# Patient Record
Sex: MALE | Race: WHITE | URBAN - METROPOLITAN AREA
[De-identification: names, ages, dates, MRNs, and addresses within clinical notes are randomized per-mention and may not be internally consistent; named-entity substitution may affect disease eponyms.]

---

## 2017-06-14 ENCOUNTER — TELEPHONE (OUTPATIENT)
Dept: DERMATOLOGY | Age: 47
End: 2017-06-14

## 2017-06-22 ENCOUNTER — OFFICE VISIT (OUTPATIENT)
Dept: DERMATOLOGY | Age: 47
End: 2017-06-22

## 2017-06-22 DIAGNOSIS — L82.0 INFLAMED SEBORRHEIC KERATOSIS: Primary | ICD-10-CM

## 2017-06-22 PROCEDURE — 17110 DESTRUCTION B9 LES UP TO 14: CPT | Performed by: DERMATOLOGY

## 2017-11-06 ENCOUNTER — OFFICE VISIT (OUTPATIENT)
Dept: DERMATOLOGY | Age: 47
End: 2017-11-06

## 2017-11-06 DIAGNOSIS — D22.9 MULTIPLE BENIGN NEVI: Primary | ICD-10-CM

## 2017-11-06 DIAGNOSIS — L82.0 INFLAMED SEBORRHEIC KERATOSIS: ICD-10-CM

## 2017-11-06 DIAGNOSIS — Z12.83 SCREENING EXAM FOR SKIN CANCER: ICD-10-CM

## 2017-11-06 PROCEDURE — G8484 FLU IMMUNIZE NO ADMIN: HCPCS | Performed by: DERMATOLOGY

## 2017-11-06 PROCEDURE — G8421 BMI NOT CALCULATED: HCPCS | Performed by: DERMATOLOGY

## 2017-11-06 PROCEDURE — 99213 OFFICE O/P EST LOW 20 MIN: CPT | Performed by: DERMATOLOGY

## 2017-11-06 PROCEDURE — 1036F TOBACCO NON-USER: CPT | Performed by: DERMATOLOGY

## 2017-11-06 PROCEDURE — G8427 DOCREV CUR MEDS BY ELIG CLIN: HCPCS | Performed by: DERMATOLOGY

## 2017-11-06 PROCEDURE — 17110 DESTRUCTION B9 LES UP TO 14: CPT | Performed by: DERMATOLOGY

## 2017-11-06 RX ORDER — LOVASTATIN 10 MG/1
10 TABLET ORAL NIGHTLY
COMMUNITY

## 2017-11-06 NOTE — PROGRESS NOTES
brown macules and papules. 2. L flexor forearm - well-defined \"stuck-on\" verrucous tan-brown papule(s) w/ surrounding bright erythema     Assessment and Plan     1. Benign acquired melanocytic nevi  -Recommend monthly self skin exams   -Educated regarding the ABCDEs of melanoma detection   -Call for any new/changing moles or concerning lesions  -Reviewed sun protective behavior -- sun avoidance during the peak hours of the day, sun-protective clothing (including hat and sunglasses), sunscreen use (water resistant, broad spectrum, SPF at least 30, need for reapplication every 2 to 3 hours), avoidance of tanning beds   -Return for full skin exam in 1 year (sooner if indicated)     2. Inflamed seborrheic keratosis(es)  -Reassurance re: benignity  -1 lesion(s) treated w/ liquid nitrogen x 2 cycles - L forearm. Edu re: risk of blister formation, discomfort, scar, hypopigmentation. Discussed wound care.

## 2018-08-24 DIAGNOSIS — R20.9 DISTURBANCE OF SKIN SENSATION: Primary | ICD-10-CM

## 2018-08-24 DIAGNOSIS — R21 RASH: Primary | ICD-10-CM

## 2018-08-24 RX ORDER — PREDNISONE 10 MG/1
TABLET ORAL
Qty: 14 TABLET | Refills: 0 | Status: CANCELLED | OUTPATIENT
Start: 2018-08-24

## 2018-08-24 RX ORDER — PREDNISONE 10 MG/1
TABLET ORAL
Qty: 14 TABLET | Refills: 0 | Status: SHIPPED | OUTPATIENT
Start: 2018-08-24 | End: 2018-11-12

## 2018-11-12 ENCOUNTER — OFFICE VISIT (OUTPATIENT)
Dept: DERMATOLOGY | Age: 48
End: 2018-11-12
Payer: COMMERCIAL

## 2018-11-12 DIAGNOSIS — R20.2 NOTALGIA PARESTHETICA: ICD-10-CM

## 2018-11-12 DIAGNOSIS — L82.1 SEBORRHEIC KERATOSES: ICD-10-CM

## 2018-11-12 DIAGNOSIS — L82.0 INFLAMED SEBORRHEIC KERATOSIS: ICD-10-CM

## 2018-11-12 DIAGNOSIS — D22.9 MULTIPLE BENIGN NEVI: Primary | ICD-10-CM

## 2018-11-12 DIAGNOSIS — D48.5 NEOPLASM OF UNCERTAIN BEHAVIOR OF SKIN: ICD-10-CM

## 2018-11-12 PROCEDURE — 11100 PR BIOPSY OF SKIN LESION: CPT | Performed by: DERMATOLOGY

## 2018-11-12 PROCEDURE — 99214 OFFICE O/P EST MOD 30 MIN: CPT | Performed by: DERMATOLOGY

## 2018-11-12 PROCEDURE — 17110 DESTRUCTION B9 LES UP TO 14: CPT | Performed by: DERMATOLOGY

## 2018-11-12 NOTE — PATIENT INSTRUCTIONS
Protecting Yourself From the Sun    · Apply broad spectrum water resistant sunscreen with an SPF of at least 30 to exposed areas of the skin. Dont forget the ears and lips! Remember to reapply sunscreen about every 2 hours and after swimming or sweating. · Wear sun protective clothing. Swim shirts (aka. rash guards) are a great idea and negates the need to reapply sunscreen in those areas. · Seek the shade whenever possible especially between the hours of 10 am and 4 pm when the suns rays are the strongest.     · Avoid tanning beds       Biopsy Wound Care Instructions    · Keep the bandage in place for 24 hours. · Cleanse the wound with mild soapy water daily   Gently dry the area.  Apply Vaseline or petroleum jelly to the wound using a cotton tipped applicator.  Cover with a clean bandage.  Repeat this process until the biopsy site is healed.  If you had stitches placed, continue treating the site until the stitches are removed. Remember to make an appointment to return to have your stitches removed by our staff.  You may shower and bathe as usual.       ** Biopsy results generally take around 7 business days to come back. If you have not heard from us by then, please call the office at [phone] between 8AM and 4PM Monday through Friday.

## 2018-11-15 LAB — DERMATOLOGY PATHOLOGY REPORT: NORMAL

## 2019-03-29 ENCOUNTER — OFFICE VISIT (OUTPATIENT)
Dept: ORTHOPEDIC SURGERY | Age: 49
End: 2019-03-29
Payer: COMMERCIAL

## 2019-03-29 VITALS — HEIGHT: 75 IN | BODY MASS INDEX: 30.95 KG/M2 | WEIGHT: 248.9 LBS

## 2019-03-29 DIAGNOSIS — M79.672 LEFT FOOT PAIN: Primary | ICD-10-CM

## 2019-03-29 DIAGNOSIS — M72.2 PLANTAR FASCIITIS OF LEFT FOOT: ICD-10-CM

## 2019-03-29 PROCEDURE — 99243 OFF/OP CNSLTJ NEW/EST LOW 30: CPT | Performed by: ORTHOPAEDIC SURGERY

## 2019-03-29 PROCEDURE — L3040 FT ARCH SUPRT PREMOLD LONGIT: HCPCS | Performed by: ORTHOPAEDIC SURGERY

## 2019-03-29 RX ORDER — MELOXICAM 15 MG/1
15 TABLET ORAL DAILY
Qty: 30 TABLET | Refills: 2 | Status: SHIPPED | OUTPATIENT
Start: 2019-03-29

## 2019-11-18 ENCOUNTER — OFFICE VISIT (OUTPATIENT)
Dept: DERMATOLOGY | Age: 49
End: 2019-11-18
Payer: COMMERCIAL

## 2019-11-18 DIAGNOSIS — L82.0 INFLAMED SEBORRHEIC KERATOSIS: ICD-10-CM

## 2019-11-18 DIAGNOSIS — D22.9 MULTIPLE BENIGN NEVI: Primary | ICD-10-CM

## 2019-11-18 DIAGNOSIS — Z12.83 SCREENING EXAM FOR SKIN CANCER: ICD-10-CM

## 2019-11-18 DIAGNOSIS — L57.0 ACTINIC KERATOSES: ICD-10-CM

## 2019-11-18 PROCEDURE — G8427 DOCREV CUR MEDS BY ELIG CLIN: HCPCS | Performed by: DERMATOLOGY

## 2019-11-18 PROCEDURE — G8417 CALC BMI ABV UP PARAM F/U: HCPCS | Performed by: DERMATOLOGY

## 2019-11-18 PROCEDURE — 99213 OFFICE O/P EST LOW 20 MIN: CPT | Performed by: DERMATOLOGY

## 2019-11-18 PROCEDURE — G8484 FLU IMMUNIZE NO ADMIN: HCPCS | Performed by: DERMATOLOGY

## 2019-11-18 PROCEDURE — 1036F TOBACCO NON-USER: CPT | Performed by: DERMATOLOGY

## 2019-11-18 PROCEDURE — 17003 DESTRUCT PREMALG LES 2-14: CPT | Performed by: DERMATOLOGY

## 2019-11-18 PROCEDURE — 17000 DESTRUCT PREMALG LESION: CPT | Performed by: DERMATOLOGY

## 2019-11-18 PROCEDURE — 17110 DESTRUCTION B9 LES UP TO 14: CPT | Performed by: DERMATOLOGY

## 2020-11-19 ENCOUNTER — OFFICE VISIT (OUTPATIENT)
Dept: DERMATOLOGY | Age: 50
End: 2020-11-19
Payer: COMMERCIAL

## 2020-11-19 VITALS — TEMPERATURE: 97.5 F

## 2020-11-19 PROCEDURE — 1036F TOBACCO NON-USER: CPT | Performed by: DERMATOLOGY

## 2020-11-19 PROCEDURE — G8421 BMI NOT CALCULATED: HCPCS | Performed by: DERMATOLOGY

## 2020-11-19 PROCEDURE — G8427 DOCREV CUR MEDS BY ELIG CLIN: HCPCS | Performed by: DERMATOLOGY

## 2020-11-19 PROCEDURE — 99214 OFFICE O/P EST MOD 30 MIN: CPT | Performed by: DERMATOLOGY

## 2020-11-19 PROCEDURE — 3017F COLORECTAL CA SCREEN DOC REV: CPT | Performed by: DERMATOLOGY

## 2020-11-19 PROCEDURE — 17003 DESTRUCT PREMALG LES 2-14: CPT | Performed by: DERMATOLOGY

## 2020-11-19 PROCEDURE — 17000 DESTRUCT PREMALG LESION: CPT | Performed by: DERMATOLOGY

## 2020-11-19 PROCEDURE — G8484 FLU IMMUNIZE NO ADMIN: HCPCS | Performed by: DERMATOLOGY

## 2020-11-19 RX ORDER — EMPAGLIFLOZIN 10 MG/1
10 TABLET, FILM COATED ORAL DAILY
COMMUNITY